# Patient Record
Sex: FEMALE | Race: OTHER | Employment: UNEMPLOYED | URBAN - NONMETROPOLITAN AREA
[De-identification: names, ages, dates, MRNs, and addresses within clinical notes are randomized per-mention and may not be internally consistent; named-entity substitution may affect disease eponyms.]

---

## 2017-12-28 ENCOUNTER — TELEPHONE (OUTPATIENT)
Dept: ENDOCRINOLOGY | Age: 47
End: 2017-12-28

## 2017-12-28 NOTE — TELEPHONE ENCOUNTER
Notified Dr Gino Reynoso office that Dr Kevin Childers is leaving practice and will not be seeing this patient. If Dr Adarsh Elias is agreeable, Billy Gipson can see this patient. Otherwise she will need referred to another Endo.

## 2025-01-28 RX ORDER — MELOXICAM 15 MG/1
15 TABLET ORAL DAILY
Qty: 90 TABLET | Refills: 0 | OUTPATIENT
Start: 2025-01-28